# Patient Record
Sex: FEMALE | Race: OTHER | HISPANIC OR LATINO | Employment: UNEMPLOYED | URBAN - METROPOLITAN AREA
[De-identification: names, ages, dates, MRNs, and addresses within clinical notes are randomized per-mention and may not be internally consistent; named-entity substitution may affect disease eponyms.]

---
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
## 2024-01-02 VITALS
TEMPERATURE: 98.4 F | SYSTOLIC BLOOD PRESSURE: 141 MMHG | OXYGEN SATURATION: 100 % | HEART RATE: 96 BPM | RESPIRATION RATE: 18 BRPM | DIASTOLIC BLOOD PRESSURE: 85 MMHG

## 2024-01-02 PROCEDURE — 94640 AIRWAY INHALATION TREATMENT: CPT

## 2024-01-02 PROCEDURE — 99284 EMERGENCY DEPT VISIT MOD MDM: CPT

## 2024-01-03 ENCOUNTER — HOSPITAL ENCOUNTER (EMERGENCY)
Facility: HOSPITAL | Age: 19
Discharge: HOME | End: 2024-01-03
Attending: EMERGENCY MEDICINE

## 2024-01-03 ENCOUNTER — APPOINTMENT (EMERGENCY)
Dept: RADIOLOGY | Facility: HOSPITAL | Age: 19
End: 2024-01-03

## 2024-01-03 DIAGNOSIS — R06.02 SOB (SHORTNESS OF BREATH): Primary | ICD-10-CM

## 2024-01-03 DIAGNOSIS — R11.0 NAUSEA: ICD-10-CM

## 2024-01-03 DIAGNOSIS — B34.9 VIRAL SYNDROME: ICD-10-CM

## 2024-01-03 LAB
EXT PREGNANCY TEST URINE: NEGATIVE
EXT. CONTROL: NORMAL
FLUAV RNA RESP QL NAA+PROBE: NEGATIVE
FLUBV RNA RESP QL NAA+PROBE: NEGATIVE
RSV RNA RESP QL NAA+PROBE: NEGATIVE
SARS-COV-2 RNA RESP QL NAA+PROBE: NEGATIVE

## 2024-01-03 PROCEDURE — 81025 URINE PREGNANCY TEST: CPT | Performed by: EMERGENCY MEDICINE

## 2024-01-03 PROCEDURE — 96372 THER/PROPH/DIAG INJ SC/IM: CPT

## 2024-01-03 PROCEDURE — 71045 X-RAY EXAM CHEST 1 VIEW: CPT

## 2024-01-03 PROCEDURE — 99285 EMERGENCY DEPT VISIT HI MDM: CPT | Performed by: EMERGENCY MEDICINE

## 2024-01-03 PROCEDURE — 0241U HB NFCT DS VIR RESP RNA 4 TRGT: CPT | Performed by: EMERGENCY MEDICINE

## 2024-01-03 RX ORDER — IPRATROPIUM BROMIDE AND ALBUTEROL SULFATE 2.5; .5 MG/3ML; MG/3ML
3 SOLUTION RESPIRATORY (INHALATION) ONCE
Status: COMPLETED | OUTPATIENT
Start: 2024-01-03 | End: 2024-01-03

## 2024-01-03 RX ORDER — ONDANSETRON 4 MG/1
4 TABLET, ORALLY DISINTEGRATING ORAL ONCE
Status: COMPLETED | OUTPATIENT
Start: 2024-01-03 | End: 2024-01-03

## 2024-01-03 RX ORDER — ONDANSETRON 4 MG/1
4 TABLET, ORALLY DISINTEGRATING ORAL EVERY 6 HOURS PRN
Qty: 16 TABLET | Refills: 0 | Status: SHIPPED | OUTPATIENT
Start: 2024-01-03 | End: 2024-01-07

## 2024-01-03 RX ORDER — KETOROLAC TROMETHAMINE 30 MG/ML
30 INJECTION, SOLUTION INTRAMUSCULAR; INTRAVENOUS ONCE
Status: COMPLETED | OUTPATIENT
Start: 2024-01-03 | End: 2024-01-03

## 2024-01-03 RX ADMIN — KETOROLAC TROMETHAMINE 30 MG: 30 INJECTION, SOLUTION INTRAMUSCULAR; INTRAVENOUS at 01:29

## 2024-01-03 RX ADMIN — ONDANSETRON 4 MG: 4 TABLET, ORALLY DISINTEGRATING ORAL at 00:28

## 2024-01-03 RX ADMIN — IPRATROPIUM BROMIDE AND ALBUTEROL SULFATE 3 ML: 2.5; .5 SOLUTION RESPIRATORY (INHALATION) at 00:28

## 2024-01-03 NOTE — ED PROVIDER NOTES
History  Chief Complaint   Patient presents with    Pain With Breathing    Nasal Congestion     Pt with symptoms on Saturday.  Pt unable to finish work due to symptoms     Patient is a 18-year-old female coming in today with progressively worsening symptoms.  Patient states that she started with what she thought was congestion in her chest but progressively worsened to runny stuffy nose.  She feels that she is short of breath.  She has no chest pain, palpitations, syncope.  She does state that she was around people who are sick but unsure what they have.  She has some nausea without any vomiting.  She has decreased p.o. intake due to nausea and decreased p.o. appetite.  She has no vomiting, diarrhea, ear pain.  She did have sore throat which resolved.  She has no recent travel, recent antibiotic use, not on birth control no recent surgeries.  No history of PE or DVT      History provided by:  Patient   used: No    Shortness of Breath  Severity:  Mild  Onset quality:  Gradual  Duration:  2 days  Timing:  Intermittent  Progression:  Waxing and waning  Chronicity:  New  Relieved by:  Nothing  Worsened by:  Nothing  Ineffective treatments:  None tried  Associated symptoms: cough    Associated symptoms: no abdominal pain, no chest pain, no claudication, no diaphoresis, no ear pain, no fever, no headaches, no hemoptysis, no neck pain, no PND, no rash, no sore throat, no sputum production, no syncope, no swollen glands, no vomiting and no wheezing    Risk factors: no recent alcohol use, no family hx of DVT, no hx of cancer, no hx of PE/DVT, no obesity, no oral contraceptive use, no prolonged immobilization, no recent surgery and no tobacco use        Prior to Admission Medications   Prescriptions Last Dose Informant Patient Reported? Taking?   ondansetron (Zofran ODT) 4 mg disintegrating tablet   No No   Sig: Take 1 tablet (4 mg total) by mouth every 6 (six) hours as needed for nausea or vomiting       Facility-Administered Medications: None       No past medical history on file.    No past surgical history on file.    No family history on file.  I have reviewed and agree with the history as documented.    E-Cigarette/Vaping     E-Cigarette/Vaping Substances     Social History     Tobacco Use    Smoking status: Never    Smokeless tobacco: Never   Substance Use Topics    Alcohol use: Never    Drug use: Never       Review of Systems   Constitutional:  Negative for chills, diaphoresis and fever.   HENT:  Negative for ear pain and sore throat.    Eyes:  Negative for pain and visual disturbance.   Respiratory:  Positive for cough and shortness of breath. Negative for hemoptysis, sputum production and wheezing.    Cardiovascular:  Negative for chest pain, palpitations, claudication, syncope and PND.   Gastrointestinal:  Negative for abdominal pain and vomiting.   Genitourinary:  Negative for dysuria and hematuria.   Musculoskeletal:  Negative for arthralgias, back pain and neck pain.   Skin:  Negative for color change and rash.   Neurological:  Negative for seizures, syncope and headaches.   All other systems reviewed and are negative.      Physical Exam  Physical Exam  Vitals and nursing note reviewed.   Constitutional:       General: She is not in acute distress.     Appearance: She is well-developed.   HENT:      Head: Normocephalic and atraumatic.      Comments: Patient maintaining airway and secretions. No stridor . No brawniness under tongue.          Mouth/Throat:      Mouth: Mucous membranes are moist.   Eyes:      Extraocular Movements: Extraocular movements intact.      Conjunctiva/sclera: Conjunctivae normal.      Pupils: Pupils are equal, round, and reactive to light.   Neck:      Comments: No meningeal signs  Cardiovascular:      Rate and Rhythm: Normal rate and regular rhythm.      Pulses:           Radial pulses are 2+ on the right side and 2+ on the left side.      Heart sounds: Normal heart sounds, S1  normal and S2 normal. No murmur heard.  Pulmonary:      Effort: Pulmonary effort is normal. No respiratory distress.      Breath sounds: Normal breath sounds.      Comments: No conversational dyspnea  Abdominal:      Palpations: Abdomen is soft.      Tenderness: There is no abdominal tenderness.   Musculoskeletal:         General: No swelling.      Cervical back: Normal range of motion and neck supple.      Right lower leg: No edema.      Left lower leg: No edema.   Lymphadenopathy:      Cervical: No cervical adenopathy.   Skin:     General: Skin is warm and dry.      Capillary Refill: Capillary refill takes less than 2 seconds.   Neurological:      General: No focal deficit present.      Mental Status: She is alert and oriented to person, place, and time.      GCS: GCS eye subscore is 4. GCS verbal subscore is 5. GCS motor subscore is 6.      Cranial Nerves: Cranial nerves 2-12 are intact.      Sensory: Sensation is intact.      Motor: Motor function is intact.      Coordination: Coordination is intact.      Comments: No slurred speech.  No facial asymmetry.  No tongue deviation   Psychiatric:         Mood and Affect: Mood normal.         Thought Content: Thought content normal.         Judgment: Judgment normal.         Vital Signs  ED Triage Vitals [01/02/24 2353]   Temperature Pulse Respirations Blood Pressure SpO2   98.4 °F (36.9 °C) 96 18 141/85 100 %      Temp Source Heart Rate Source Patient Position - Orthostatic VS BP Location FiO2 (%)   Oral -- Lying -- --      Pain Score       --           Vitals:    01/02/24 2353   BP: 141/85   Pulse: 96   Patient Position - Orthostatic VS: Lying         Visual Acuity      ED Medications  Medications   ketorolac (TORADOL) injection 30 mg (has no administration in time range)   ondansetron (ZOFRAN-ODT) dispersible tablet 4 mg (4 mg Oral Given 1/3/24 0028)   ipratropium-albuterol (DUO-NEB) 0.5-2.5 mg/3 mL inhalation solution 3 mL (3 mL Nebulization Given 1/3/24 0028)        Diagnostic Studies  Results Reviewed       Procedure Component Value Units Date/Time    FLU/RSV/COVID - if FLU/RSV clinically relevant [[id number]]  (Normal) Collected: 01/03/24 0034    Lab Status: Final result Specimen: Nares from Nose Updated: 01/03/24 0118     SARS-CoV-2 Negative     INFLUENZA A PCR Negative     INFLUENZA B PCR Negative     RSV PCR Negative    Narrative:      FOR PEDIATRIC PATIENTS - copy/paste COVID Guidelines URL to browser: [email or web address]    SARS-CoV-2 assay is a Nucleic Acid Amplification assay intended for the  qualitative detection of nucleic acid from SARS-CoV-2 in nasopharyngeal  swabs. Results are for the presumptive identification of SARS-CoV-2 RNA.    Positive results are indicative of infection with SARS-CoV-2, the virus  causing COVID-19, but do not rule out bacterial infection or co-infection  with other viruses. Laboratories within the [place] and its  territories are required to report all positive results to the appropriate  public health authorities. Negative results do not preclude SARS-CoV-2  infection and should not be used as the sole basis for treatment or other  patient management decisions. Negative results must be combined with  clinical observations, patient history, and epidemiological information.  This test has not been FDA cleared or approved.    This test has been authorized by FDA under an Emergency Use Authorization  (EUA). This test is only authorized for the duration of time the  declaration that circumstances exist justifying the authorization of the  emergency use of an in vitro diagnostic tests for detection of SARS-CoV-2  virus and/or diagnosis of COVID-19 infection under section 564(b)(1) of  the Act, 21 U.S.C. 360bbb-3(b)(1), unless the authorization is terminated  or revoked sooner. The test has been validated but independent review by FDA  and CLIA is pending.    Test performed using  "[employer] GeneXpert: This RT-PCR assay targets N2,  a region unique to SARS-CoV-2. A conserved region in the E-gene was chosen  for pan-Sarbecovirus detection which includes SARS-CoV-2.    According to CMS-2020-01-R, this platform meets the definition of high-throughput technology.    POCT pregnancy, urine [[id number]]  (Normal) Resulted: 01/03/24 0116    Lab Status: Final result Updated: 01/03/24 0116     EXT Preg Test, Ur Negative     Control Valid                   XR chest 1 view portable   ED Interpretation by Cindy Horan DO (01/03 0047)   No acute findings on my read                 Procedures  Procedures         ED Course  ED Course as of 01/03/24 0126 Wed Jan 03, 2024   0023 Patient is a 18-year-old female coming in today with shortness of breath and overall not feeling well.  On exam she is in no acute respiratory distress with no conversational dyspnea.  Will give Zofran and DuoNeb for symptomatic control as well as check for pregnancy and flu COVID and check chest x-ray.  Patient agreeable    Disclosure: Voice to text software was used in the preparation of this document and could have resulted in translational errors.      Occasional wrong word or \"sound a like\" substitutions may have occurred due to the inherent limitations of voice recognition software.  Read the chart carefully and recognize, using context, where substitutions have occurred.       I have independently reviewed external records are available to me to the level of detail possible within the time constraints of my patient care responsibilities in the ED.       0117 POCT pregnancy, urine  Pregnancy negative. Will give Toradol   0125 Patient resting in bed in no distress.  Able to have full conversation without any dyspnea.  Updated on labs.  Will give Toradol before DC home.  Return to ER instructions given      Counseling: I had a detailed discussion with the patient and/or guardian regarding: the historical points, exam findings, " "and any diagnostic results supporting the discharge diagnosis, lab results, radiology results, discharge instructions reviewed with patient and/or family/caregiver and understanding was verbalized. Instructions given to return to the emergency department if symptoms worsen or persist, or if there are any questions or concerns that arise at home.     All imaging and/or lab testing discussed with patient, strict return to ED precautions discussed. Patient recommended to follow up promptly with appropriate outpatient provider. Patient and/or family members verbalizes understanding and agrees with plan. Patient and/or family members were given opportunity to ask questions, all questions were answered at this time. Patient is stable for discharge             CRAFFT      Flowsheet Row Most Recent Value   CRAFFT Initial Screen: During the past 12 months, did you:    1. Drink any alcohol (more than a few sips)?  No Filed at: 01/03/2024 0113   2. Smoke any marijuana or hashish No Filed at: 01/03/2024 0113   3. Use anything else to get high? (\"anything else\" includes illegal drugs, over the counter and prescription drugs, and things that you sniff or 'howell')? No Filed at: 01/03/2024 0113                      PERC Rule for PE      Flowsheet Row Most Recent Value   PERC Rule for PE    Age >=50 0 Filed at: 01/03/2024 0025   HR >=100 0 Filed at: 01/03/2024 0025   O2 Sat on room air < 95% 0 Filed at: 01/03/2024 0025   History of PE or DVT 0 Filed at: 01/03/2024 0025   Recent trauma or surgery 0 Filed at: 01/03/2024 0025   Hemoptysis 0 Filed at: 01/03/2024 0025   Exogenous estrogen 0 Filed at: 01/03/2024 0025   Unilateral leg swelling 0 Filed at: 01/03/2024 0025   PERC Rule for PE Results 0 Filed at: 01/03/2024 0025                    Wells' Criteria for PE      Flowsheet Row Most Recent Value   Wells' Criteria for PE    Clinical signs and symptoms of DVT 0 Filed at: 01/03/2024 0025   PE is primary diagnosis or equally likely 0 " Filed at: 01/03/2024 0025   HR >100 0 Filed at: 01/03/2024 0025   Immobilization at least 3 days or Surgery in the previous 4 weeks 0 Filed at: 01/03/2024 0025   Previous, objectively diagnosed PE or DVT 0 Filed at: 01/03/2024 0025   Hemoptysis 0 Filed at: 01/03/2024 0025   Malignancy with treatment within 6 months or palliative 0 Filed at: 01/03/2024 0025   Wells' Criteria Total 0 Filed at: 01/03/2024 0025                  Medical Decision Making    Differential diagnosis includes but not limited to:  COPD exacerbation, asthma exacerbation, pneumonia, PE, pulmonary edema, pulmonary effusions, lung mass/malignancy, CHF, ACS, NSTEMI, acute kidney injury, electrolyte dysfunction, inhalation injury, anemia, valvular disorder, viral etiology,    Based on history and physical more consistent with viral etiology versus pneumonia versus bronchospasm.  She is PERC negative and low Wells therefore no D-dimer    Amount and/or Complexity of Data Reviewed  Labs: ordered. Decision-making details documented in ED Course.     Details: Pregnancy negative    Flu/covid/rsv negative    Radiology: ordered and independent interpretation performed. Decision-making details documented in ED Course.     Details: Chest x-ray interpreted by myself as no acute findings on my read    Risk  Prescription drug management.             Disposition  Final diagnoses:   SOB (shortness of breath)   Viral syndrome   Nausea     Time reflects when diagnosis was documented in both MDM as applicable and the Disposition within this note       Time User Action Codes Description Comment    1/3/2024  1:18 AM Bendock, Cindy L Add [R06.02] SOB (shortness of breath)     1/3/2024  1:19 AM Bendock, Cindy L Add [B34.9] Viral syndrome     1/3/2024  1:20 AM Bendock, Cindy L Add [R11.0] Nausea           ED Disposition       ED Disposition   Discharge    Condition   Stable    Date/Time   Wed Gerardo 3, 2024 0119    Comment   Zoe Gregoria discharge to home/self  care.                   Follow-up Information       Follow up With Specialties Details Why Contact Info Additional Information    [facility] Schedule an appointment as soon as possible for a visit in 1 week  [address] [place] [address], [address] [place] [id number] [phone] [facility], [address] [place] [address], [place] [address], [place], [place], [address] [phone]            Patient's Medications   Discharge Prescriptions    ONDANSETRON (ZOFRAN ODT) 4 MG DISINTEGRATING TABLET    Take 1 tablet (4 mg total) by mouth every 6 (six) hours as needed for nausea or vomiting for up to 4 days       Start Date: 1/3/2024  End Date: 1/7/2024       Order Dose: 4 mg       Quantity: 16 tablet    Refills: 0       No discharge procedures on file.    PDMP Review       None            ED Provider  Electronically Signed by             Cindy Horan DO  01/03/24 0126

## 2024-01-03 NOTE — Clinical Note
Zoe Kinney was seen and treated in our emergency department on 1/2/2024.                Diagnosis:     Zoe  may return to work on return date.    She may return on this date: 01/05/2024         If you have any questions or concerns, please don't hesitate to call.      Cindy Horan, DO    ______________________________           _______________          _______________  Hospital Representative                              Date                                Time

## 2024-01-03 NOTE — ED NOTES
Pt ambulated to bathroom independently with steady gait.     Linnette Carrasco RN  01/03/24 0106

## 2025-03-01 ENCOUNTER — OFFICE VISIT (OUTPATIENT)
Dept: URGENT CARE | Age: 20
End: 2025-03-01
Payer: COMMERCIAL

## 2025-03-01 VITALS
HEIGHT: 66 IN | SYSTOLIC BLOOD PRESSURE: 126 MMHG | WEIGHT: 196 LBS | DIASTOLIC BLOOD PRESSURE: 84 MMHG | HEART RATE: 91 BPM | BODY MASS INDEX: 31.5 KG/M2 | OXYGEN SATURATION: 99 % | RESPIRATION RATE: 18 BRPM | TEMPERATURE: 98.6 F

## 2025-03-01 DIAGNOSIS — Z02.4 DRIVER'S PERMIT PE (PHYSICAL EXAMINATION): Primary | ICD-10-CM

## 2025-03-01 NOTE — PROGRESS NOTES
[facility]        NAME: Zoe Kinney is a 19 y.o. female  : 2005    MRN: [id number]  DATE: 2025  TIME: 11:44 AM    Assessment and Plan   's permit PE (physical examination) [Z02.4]  1. 's permit PE (physical examination)              Patient Instructions       Follow up with PCP in 3-5 days.  Proceed to  ER if symptoms worsen.    If tests have been performed at [facility], our office will contact you with results if changes need to be made to the care plan discussed with you at the visit.  You can review your full results on [facility].    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 Denies any neurological disorders, neuropsychiatric disorders, circulatory disorder, cardiac disorder, HTN, uncontrolled epilepsy, uncontrolled DM, Cognitive impairment, alcohol and drug abuse, conditions that causes lapses of consciousness. Admits taking vestura. Denies any current medications. Denies any allergies to medications.    Patient does wear glasses.         Review of Systems   Review of Systems   Constitutional: Negative.    HENT: Negative.     Respiratory: Negative.     Cardiovascular: Negative.    Musculoskeletal: Negative.    Psychiatric/Behavioral: Negative.           Current Medications       Current Outpatient Medications:     ondansetron (Zofran ODT) 4 mg disintegrating tablet, Take 1 tablet (4 mg total) by mouth every 6 (six) hours as needed for nausea or vomiting, Disp: 20 tablet, Rfl: 0    Current Allergies     Allergies as of 2025    (No Known Allergies)            The following portions of the patient's history were reviewed and updated as appropriate: allergies, current medications, past family history, past medical history, past social history, past surgical history and  "problem list.     History reviewed. No pertinent past medical history.    History reviewed. No pertinent surgical history.    History reviewed. No pertinent family history.      Medications have been verified.        Objective   /84   Pulse 91   Temp 98.6 °F (37 °C)   Resp 18   Ht 5' 6\" (1.676 m)   Wt 88.9 kg (196 lb)   SpO2 99%   BMI 31.64 kg/m²   No LMP recorded.       Physical Exam     Physical Exam  Vitals and nursing note reviewed.   Constitutional:       Appearance: Normal appearance.   HENT:      Head: Normocephalic.      Comments: No pain over frontal or maxillary sinus     Right Ear: Tympanic membrane, ear canal and external ear normal.      Left Ear: Tympanic membrane, ear canal and external ear normal.      Mouth/Throat:      Mouth: Mucous membranes are moist.   Eyes:      Extraocular Movements: Extraocular movements intact.      Pupils: Pupils are equal, round, and reactive to light.   Cardiovascular:      Rate and Rhythm: Normal rate and regular rhythm.      Heart sounds: Normal heart sounds.   Pulmonary:      Breath sounds: Normal breath sounds. No wheezing.   Musculoskeletal:      Comments: Upper and lower body motor intact. Heel and toe intact. Negative Romberg . Neck and back mobility intact   Neurological:      General: No focal deficit present.      Mental Status: She is alert and oriented to person, place, and time.   Psychiatric:         Mood and Affect: Mood normal.         Behavior: Behavior normal.                   "

## 2025-07-22 ENCOUNTER — HOSPITAL ENCOUNTER (EMERGENCY)
Facility: HOSPITAL | Age: 20
Discharge: HOME | End: 2025-07-22
Attending: EMERGENCY MEDICINE | Admitting: EMERGENCY MEDICINE
Payer: OTHER MISCELLANEOUS

## 2025-07-22 ENCOUNTER — APPOINTMENT (EMERGENCY)
Dept: RADIOLOGY | Facility: HOSPITAL | Age: 20
End: 2025-07-22
Payer: OTHER MISCELLANEOUS

## 2025-07-22 ENCOUNTER — OFFICE VISIT (OUTPATIENT)
Dept: PODIATRY | Facility: CLINIC | Age: 20
End: 2025-07-22

## 2025-07-22 ENCOUNTER — TELEPHONE (OUTPATIENT)
Age: 20
End: 2025-07-22

## 2025-07-22 VITALS — HEIGHT: 66 IN | WEIGHT: 194 LBS | BODY MASS INDEX: 31.18 KG/M2

## 2025-07-22 VITALS
BODY MASS INDEX: 31.31 KG/M2 | SYSTOLIC BLOOD PRESSURE: 148 MMHG | RESPIRATION RATE: 18 BRPM | WEIGHT: 194 LBS | DIASTOLIC BLOOD PRESSURE: 76 MMHG | OXYGEN SATURATION: 99 % | HEART RATE: 104 BPM | TEMPERATURE: 97.8 F

## 2025-07-22 DIAGNOSIS — S93.402A SPRAIN OF LEFT ANKLE, UNSPECIFIED LIGAMENT, INITIAL ENCOUNTER: Primary | ICD-10-CM

## 2025-07-22 DIAGNOSIS — S93.492A SPRAIN OF ANTERIOR TALOFIBULAR LIGAMENT OF LEFT ANKLE, INITIAL ENCOUNTER: Primary | ICD-10-CM

## 2025-07-22 PROCEDURE — 73610 X-RAY EXAM OF ANKLE: CPT

## 2025-07-22 PROCEDURE — 99284 EMERGENCY DEPT VISIT MOD MDM: CPT | Performed by: PHYSICIAN ASSISTANT

## 2025-07-22 PROCEDURE — 99283 EMERGENCY DEPT VISIT LOW MDM: CPT

## 2025-07-22 PROCEDURE — 99203 OFFICE O/P NEW LOW 30 MIN: CPT | Performed by: PODIATRIST

## 2025-07-22 PROCEDURE — 73630 X-RAY EXAM OF FOOT: CPT

## 2025-07-22 RX ORDER — NAPROXEN 500 MG/1
500 TABLET ORAL 2 TIMES DAILY WITH MEALS
Qty: 180 TABLET | Refills: 0 | Status: SHIPPED | OUTPATIENT
Start: 2025-07-22

## 2025-07-22 RX ORDER — ACETAMINOPHEN 325 MG/1
975 TABLET ORAL ONCE
Status: COMPLETED | OUTPATIENT
Start: 2025-07-22 | End: 2025-07-22

## 2025-07-22 RX ADMIN — ACETAMINOPHEN 975 MG: 325 TABLET ORAL at 05:33

## 2025-07-22 NOTE — ASSESSMENT & PLAN NOTE
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 Recommend 4 weeks out of work.I will see in a month  Orders:  •  Ambulatory Referral to Podiatry  •  naproxen (EC NAPROSYN) 500 MG EC tablet; Take 1 tablet (500 mg total) by mouth 2 (two) times a day with meals  •  Brace

## 2025-07-22 NOTE — PATIENT INSTRUCTIONS
Patient Education     Ankle Strengthening Exercises   About this topic   The ankle is a commonly injured joint in your body. It supports the full weight of your body. Your chance of hurting your ankle is less if the muscles in your ankle are strong. Doing exercises for the ankle can help with balance and keep some injuries from happening.  General   Before starting with a program, ask your doctor if you are healthy enough to do these exercises. Your doctor may have you work with a  or physical therapist to make a safe exercise program to meet your needs.  Strengthening Exercises   Strengthening exercises keep your muscles firm and strong. Stand or sit up tall on a chair without arms for your exercises. Start by repeating each exercise 2 to 3 times. Work up to doing each exercise 10 times. Try to do the exercises 2 to 3 times each day. Do all exercises slowly.  Ankle pumps seated ? Move each foot up and down like you are pressing down and lifting up on a gas pedal.  Ankle alphabet seated ? Act like you are writing the alphabet with each foot. Do not move your whole leg to do this, just move your ankle. Do all of the alphabet. Take short rests if you get tired.  Exercise band exercises ? Sit on the floor with your legs out in front of you for these. You can also sit in a chair.  Pulling foot up ? You will have to have someone hold the band for this exercise. Otherwise, you can tie a large knot into each end of the band and close the ends of the band in the bottom of a door. Have the band around the top of your foot. Pull your foot up towards your head. Bring your foot back to the starting position. Switch feet and repeat.  Pushing foot down ? Loop the band around the ball of the foot. Push down as if you were pressing on a gas pedal. Bring the foot back to the starting position. Switch feet and repeat.  Pulling foot in ? Loop the band around the ball of one foot. Cross your other leg over the leg with the  band around it. Put the top foot on top of the band as if you were stepping on it. Pull the bottom foot in. Bring the foot back to the starting position. Switch feet and repeat.  Pulling foot out ? Loop the band around the ball of one foot. Step on the band with your other foot and straighten the leg. Pull the bottom foot out. Bring the foot back to the starting position. Switch feet and repeat.  Heel raises ? Hold on to a counter. Lift your heels up and rise up onto your toes. Lower yourself back down.  Toe lifts ? Lift your toes up and put your weight onto your heels. Hold 3 to 5 seconds.  Single leg balance ? Lift one leg up and balance on the other leg for 10 to 30 seconds. Repeat 5 times. To make this exercise harder, try putting a thin pillow under your foot.               What will the results be?   Ankle is stronger and more stable  More range of motion  Better balance  Less chance of falling  Less chance of hurting your ankle  Easier to walk and do other activities  Helpful tips   Stay active and work out to keep your muscles strong and flexible.  Keep a healthy weight so there is not extra stress on your joints. Eat a healthy diet to keep your muscles healthy.  Be sure you do not hold your breath when exercising. This can raise your blood pressure. If you tend to hold your breath, try counting out loud when exercising. If any exercise bothers you, stop right away.  Try walking or cycling at an easy pace for a few minutes to warm up your muscles. Do this again after exercising.  If you have trouble with balance, be careful with the standing exercises. You may want to have someone with you when you are doing these. You may want to hold on to something stable while doing the exercises. If you don't feel safe, don't do them.  Exercise may be slightly uncomfortable, but you should not have sharp pains. If you do get sharp pains, stop what you are doing. If the sharp pains continue, call your doctor.  Wear shoes  with good support. Do not go barefoot.  Use proper footwear when playing sports or exercising. This may include athletic supports, shoes, or shoe inserts that keep your foot stable.  Last Reviewed Date   2021-07-26  Consumer Information Use and Disclaimer   This generalized information is a limited summary of diagnosis, treatment, and/or medication information. It is not meant to be comprehensive and should be used as a tool to help the user understand and/or assess potential diagnostic and treatment options. It does NOT include all information about conditions, treatments, medications, side effects, or risks that may apply to a specific patient. It is not intended to be medical advice or a substitute for the medical advice, diagnosis, or treatment of a health care provider based on the health care provider's examination and assessment of a patient’s specific and unique circumstances. Patients must speak with a health care provider for complete information about their health, medical questions, and treatment options, including any risks or benefits regarding use of medications. This information does not endorse any treatments or medications as safe, effective, or approved for treating a specific patient. [employer] and its affiliates disclaim any warranty or liability relating to this information or the use thereof. The use of this information is governed by the Terms of Use, available at https://www.wolterskluwer.com/en/know/clinical-effectiveness-terms   Copyright   Copyright © 2024 [employer] and its affiliates and/or licensors. All rights reserved.

## 2025-07-22 NOTE — PROGRESS NOTES
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 Recommend 4 weeks out of work.I will see in a month  Orders:  •  Ambulatory Referral to Podiatry  •  naproxen (EC NAPROSYN) 500 MG EC tablet; Take 1 tablet (500 mg total) by mouth 2 (two) times a day with meals  •  Brace    XRay 3 views of the left ankle and foot personally read by Dr. Camacho in office today and discussed with patient:    There is no acute fracture or dislocation.  No significant degenerative changes.  No lytic or blastic osseous lesion.  Soft tissues are unremarkable.      History of Present Illness   HPI  Zoe Kinney is a 19 y.o. female who presents with ankle trauma  DOI: 2025 (TODAY)    She twisted her ankle at work. She went to the ED and got an XR. She was told nothing was broken. She was referred here for further care. She is partialy able to WB, it is swollen and tender.       Review of Systems       Objective   Ht 5' 6\" (1.676 m)   Wt 88 kg (194 lb)   LMP 2025 (Exact Date)   BMI 31.31 kg/m²      Physical Exam  Vitals reviewed.     Cardiovascular:      Rate and Rhythm: Normal rate.      Pulses: Normal pulses.   Pulmonary:      Effort: Pulmonary effort is normal. No respiratory distress.     Musculoskeletal:         General: Swelling, tenderness and signs of injury present. No deformity.      Left ankle: Swelling present. No deformity, ecchymosis or lacerations. Tenderness present over the lateral malleolus and ATF ligament. No medial malleolus, base of 5th metatarsal or proximal fibula tenderness. Decreased range of motion. Normal pulse.     "  Left Achilles Tendon: No tenderness or defects. Johnson's test negative.     Skin:     General: Skin is warm.      Findings: No erythema or rash.     Neurological:      Mental Status: She is alert.      Sensory: No sensory deficit.

## 2025-07-22 NOTE — LETTER
July 22, 2025     Patient: Zoe Kinney  YOB: 2005  Date of Visit: 7/22/2025      To Whom it May Concern:    Zoe Kinney is under my professional care. Zoe was seen in my office on 7/22/2025. Zoe has acute ankle trauma. I am recommending she stay off her ankle for 4 weeks. I will reassess in 4 weeks for hopeful return to work. There may be light duty restrictions when she returns but for now, out of work for 4 weeks.    If you have any questions or concerns, please don't hesitate to call.         Sincerely,          Armando Camacho DPM        CC: No Recipients

## 2025-07-22 NOTE — TELEPHONE ENCOUNTER
Hello,    Please advise if a forced appointment can be accommodated for the patient:    Call back #: [phone]    Insurance: workmans comp     Reason for appointment: Sprain of left ankle  Please call no later then 2:00pm     Requested doctor and/or location: [facility]       Thank you.

## 2025-07-28 ENCOUNTER — TELEPHONE (OUTPATIENT)
Age: 20
End: 2025-07-28

## 2025-08-04 ENCOUNTER — TELEPHONE (OUTPATIENT)
Age: 20
End: 2025-08-04